# Patient Record
Sex: MALE | Race: WHITE | NOT HISPANIC OR LATINO
[De-identification: names, ages, dates, MRNs, and addresses within clinical notes are randomized per-mention and may not be internally consistent; named-entity substitution may affect disease eponyms.]

---

## 2019-02-26 PROBLEM — Z00.00 ENCOUNTER FOR PREVENTIVE HEALTH EXAMINATION: Status: ACTIVE | Noted: 2019-02-26

## 2022-04-28 ENCOUNTER — APPOINTMENT (OUTPATIENT)
Dept: ORTHOPEDIC SURGERY | Facility: CLINIC | Age: 62
End: 2022-04-28
Payer: OTHER MISCELLANEOUS

## 2022-04-28 VITALS — HEIGHT: 73 IN | WEIGHT: 255 LBS | BODY MASS INDEX: 33.8 KG/M2

## 2022-04-28 DIAGNOSIS — Z86.39 PERSONAL HISTORY OF OTHER ENDOCRINE, NUTRITIONAL AND METABOLIC DISEASE: ICD-10-CM

## 2022-04-28 DIAGNOSIS — R73.03 PREDIABETES.: ICD-10-CM

## 2022-04-28 DIAGNOSIS — I10 ESSENTIAL (PRIMARY) HYPERTENSION: ICD-10-CM

## 2022-04-28 PROCEDURE — 99072 ADDL SUPL MATRL&STAF TM PHE: CPT

## 2022-04-28 PROCEDURE — 99214 OFFICE O/P EST MOD 30 MIN: CPT

## 2022-04-28 RX ORDER — ROSUVASTATIN CALCIUM 5 MG/1
TABLET, FILM COATED ORAL
Refills: 0 | Status: ACTIVE | COMMUNITY

## 2022-04-28 RX ORDER — METFORMIN HYDROCHLORIDE 625 MG/1
TABLET ORAL
Refills: 0 | Status: ACTIVE | COMMUNITY

## 2022-04-28 NOTE — WORK
[Total] : total [15+ days] : 15+ days [Patient] : patient [FreeTextEntry1] : poor.\par The patient cannot return to work.

## 2022-04-28 NOTE — DISCUSSION/SUMMARY
[de-identified] : Timing and frequency of medication has been discussed with patient.\par I have consulted the  registry for the purpose of reviewing the patients controlled substance.\par

## 2022-05-03 RX ORDER — HYDROCODONE BITARTRATE AND ACETAMINOPHEN 7.5; 325 MG/1; MG/1
7.5-325 TABLET ORAL
Qty: 50 | Refills: 0 | Status: DISCONTINUED | COMMUNITY
Start: 2022-04-28 | End: 2022-05-03

## 2022-06-02 ENCOUNTER — APPOINTMENT (OUTPATIENT)
Dept: ORTHOPEDIC SURGERY | Facility: CLINIC | Age: 62
End: 2022-06-02
Payer: OTHER MISCELLANEOUS

## 2022-06-02 PROCEDURE — 99213 OFFICE O/P EST LOW 20 MIN: CPT

## 2022-06-02 PROCEDURE — 99072 ADDL SUPL MATRL&STAF TM PHE: CPT

## 2022-06-02 NOTE — HISTORY OF PRESENT ILLNESS
[de-identified] : WC 1/26/16\par \par Follow-up today. Symptoms continue without significant change. He has been doing HEP and managing with medication. OOW.

## 2022-06-02 NOTE — DISCUSSION/SUMMARY
[de-identified] : Timing and frequency of medication has been discussed with patient.\par I have consulted the  registry for the purpose of reviewing the patients controlled substance.\par \par Progress note completed by SUSANAN Olivo.\par \par Physician Instructions:\par The documentation recorded by the PA accurately reflects the service I personally performed and decisions made by me.\par

## 2022-07-07 ENCOUNTER — APPOINTMENT (OUTPATIENT)
Dept: ORTHOPEDIC SURGERY | Facility: CLINIC | Age: 62
End: 2022-07-07

## 2022-07-07 VITALS — HEIGHT: 73 IN | BODY MASS INDEX: 33.8 KG/M2 | WEIGHT: 255 LBS

## 2022-07-07 PROCEDURE — 99072 ADDL SUPL MATRL&STAF TM PHE: CPT

## 2022-07-07 PROCEDURE — 99213 OFFICE O/P EST LOW 20 MIN: CPT

## 2022-07-07 RX ORDER — NAPROXEN 500 MG/1
500 TABLET ORAL
Qty: 60 | Refills: 0 | Status: COMPLETED | COMMUNITY
Start: 2022-02-01

## 2022-07-07 RX ORDER — PANTOPRAZOLE 40 MG/1
40 TABLET, DELAYED RELEASE ORAL
Qty: 90 | Refills: 0 | Status: COMPLETED | COMMUNITY
Start: 2022-03-31

## 2022-07-07 RX ORDER — FAMOTIDINE 10 MG/1
TABLET, FILM COATED ORAL
Refills: 0 | Status: COMPLETED | COMMUNITY
End: 2022-07-07

## 2022-07-07 RX ORDER — IRBESARTAN 300 MG/1
TABLET ORAL
Refills: 0 | Status: COMPLETED | COMMUNITY
End: 2022-07-07

## 2022-07-07 RX ORDER — ALPRAZOLAM 1 MG/1
1 TABLET ORAL
Qty: 90 | Refills: 0 | Status: ACTIVE | COMMUNITY
Start: 2022-06-22

## 2022-07-07 RX ORDER — CETIRIZINE HYDROCHLORIDE 10 MG/1
10 TABLET, COATED ORAL
Qty: 30 | Refills: 0 | Status: ACTIVE | COMMUNITY
Start: 2022-06-24

## 2022-07-07 RX ORDER — AMLODIPINE BESYLATE 10 MG/1
10 TABLET ORAL
Qty: 90 | Refills: 0 | Status: ACTIVE | COMMUNITY
Start: 2022-05-03

## 2022-07-07 RX ORDER — OFLOXACIN OTIC 3 MG/ML
0.3 SOLUTION AURICULAR (OTIC)
Qty: 10 | Refills: 0 | Status: COMPLETED | COMMUNITY
Start: 2022-04-05

## 2022-07-07 RX ORDER — FLUOXETINE HYDROCHLORIDE 10 MG/1
10 CAPSULE ORAL
Qty: 180 | Refills: 0 | Status: ACTIVE | COMMUNITY
Start: 2022-05-20

## 2022-07-07 RX ORDER — IRBESARTAN AND HYDROCHLOROTHIAZIDE 300; 12.5 MG/1; MG/1
300-12.5 TABLET ORAL
Qty: 60 | Refills: 0 | Status: ACTIVE | COMMUNITY
Start: 2022-04-13

## 2022-07-07 NOTE — WORK
[Total] : total [15+ days] : 15+ days [Patient] : patient [FreeTextEntry1] : poor.\par The patient cannot return to work. No

## 2022-07-07 NOTE — REVIEW OF SYSTEMS
History of Present Illness





- General


Chief Complaint: Headache


Stated Complaint: HEADACHE


Time Seen by Provider: 02/20/18 16:49


History Source: Patient


Exam Limitations: No Limitations





- History of Present Illness


Initial Comments: 





02/20/18 18:40


Pt with episodic headache over the past 3 years relieved with fioricet which 

was prescribed by pts pcp. Pt requesting fioricet refill. Pt denies worsening s/

s. Pt states has not had any imaging for the above. Pt does state intermittent 

blurry vision with headaches since onset. Pt denies nausea, weakness, fever, or 

neck pain.





Timing/Duration: reports: 4-6 hours


Severity: Yes: mild


Associated Symptoms: reports: other





Past History





- Travel


Traveled outside of the country in the last 30 days: No





- Past Medical History


Allergies/Adverse Reactions: 


 Allergies











Allergy/AdvReac Type Severity Reaction Status Date / Time


 


No Known Allergies Allergy   Verified 02/20/18 16:49











Home Medications: 


Ambulatory Orders





NK [No Known Home Medication]  05/01/16 











- Immunization History


Immunization Up to Date: Yes





- Suicide/Smoking/Psychosocial Hx


Smoking History: Never smoked


Hx Alcohol Use: No


Drug/Substance Use Hx: No


Substance Use Type: None


Patient Lives Alone: No


Lives with/in: spouse/SO





**Review of Systems





- Review of Systems


Able to Perform ROS?: Yes


Constitutional: No: Symptoms Reported


HEENTM: Yes: Blurred Vision (intermittently)


ABD/GI: No: Symptoms Reported


Musculoskeletal: No: Symptoms Reported


Integumentary: No: Symptoms Reported


Neurological: Yes: Headache





*Physical Exam





- Vital Signs


 Last Vital Signs











Temp Pulse Resp BP Pulse Ox


 


 97.9 F   74   20   136/92   97 


 


 02/20/18 16:49  02/20/18 16:49  02/20/18 16:49  02/20/18 16:49  02/20/18 16:49














- Physical Exam


General Appearance: Yes: Nourished, Appropriately Dressed.  No: Apparent 

Distress


HEENT: positive: EOMI, SUSANNA


Cardiovascular: positive: Regular Rhythm, Regular Rate.  negative: Murmur


Extremity: positive: Normal Capillary Refill


Integumentary: positive: Normal Color, Warm, Moist


Neurologic: positive: Normal Mood/Affect, Motor Strength 5/5 (ambulatory)





ED Treatment Course





- RADIOLOGY


Radiology Studies Ordered: 














 Category Date Time Status


 


 HEAD CT WITHOUT CONTRAST [CT] Stat CT Scan  02/20/18 18:26 Ordered














Medical Decision Making





- Medical Decision Making





02/20/18 18:34


Pt with episodic headache now constant since yesterday. Pt on exam with no 

acute findings. Pt ordered for head ct and toradol. 


02/20/18 19:03


Head CT negative. Patient to be discharged home with refill to Fioricet.





*DC/Admit/Observation/Transfer


Diagnosis at time of Disposition: 


 Headache








- Discharge Dispostion


Disposition: HOME


Condition at time of disposition: Improved





- Referrals


Referrals: 


Caridad Portillo [Primary Care Provider] - 





- Patient Instructions


Printed Discharge Instructions:  DI for Migraine


Additional Instructions: 


Drink plenty of fluids and avoid triggers.


Take Fioricet for pain. 





- Post Discharge Activity [Joint Pain] : joint pain [Negative] : Heme/Lymph

## 2022-07-07 NOTE — HISTORY OF PRESENT ILLNESS
[de-identified] : WC 1/26/16\par \par Follow-up today. Symptoms continue. Some days better than others. He has been doing HEP and managing with medication. OOW.

## 2022-07-07 NOTE — DISCUSSION/SUMMARY
[de-identified] : Timing and frequency of medication has been discussed with patient.\par I have consulted the  registry for the purpose of reviewing the patients controlled substance.\par \par Progress note completed by SUSANNA Olivo.\par \par Physician Instructions:\par The documentation recorded by the PA accurately reflects the service I personally performed and decisions made by me.\par

## 2022-08-18 ENCOUNTER — APPOINTMENT (OUTPATIENT)
Dept: ORTHOPEDIC SURGERY | Facility: CLINIC | Age: 62
End: 2022-08-18

## 2022-08-18 VITALS — WEIGHT: 255 LBS | HEIGHT: 73 IN | BODY MASS INDEX: 33.8 KG/M2

## 2022-08-18 PROCEDURE — 99072 ADDL SUPL MATRL&STAF TM PHE: CPT

## 2022-08-18 PROCEDURE — 99213 OFFICE O/P EST LOW 20 MIN: CPT

## 2022-08-18 NOTE — HISTORY OF PRESENT ILLNESS
[de-identified] : WC 1/26/16\par \par Follow-up today. Symptoms continue. Notes increased pain in the right glut. Denies radiating pain. He has been doing HEP and managing with medication. OOW. [FreeTextEntry5] :  1/26/16

## 2022-08-18 NOTE — DISCUSSION/SUMMARY
[de-identified] : Timing and frequency of medication has been discussed with patient.\par I have consulted the  registry for the purpose of reviewing the patients controlled substance.\par \par Progress note completed by SUSANNA Olivo.\par \par Physician Instructions:\par The documentation recorded by the PA accurately reflects the service I personally performed and decisions made by me.\par

## 2022-09-22 ENCOUNTER — APPOINTMENT (OUTPATIENT)
Dept: ORTHOPEDIC SURGERY | Facility: CLINIC | Age: 62
End: 2022-09-22

## 2022-09-22 VITALS — HEIGHT: 73 IN | WEIGHT: 255 LBS | BODY MASS INDEX: 33.8 KG/M2

## 2022-09-22 PROCEDURE — 99213 OFFICE O/P EST LOW 20 MIN: CPT | Mod: ACP

## 2022-09-22 PROCEDURE — 99072 ADDL SUPL MATRL&STAF TM PHE: CPT | Mod: ACP

## 2022-09-22 NOTE — HISTORY OF PRESENT ILLNESS
[de-identified] : WC 1/26/16\par \par Follow-up today. Symptoms continue. Some days better than others. He has been doing HEP and managing with medication. OOW.

## 2022-11-17 ENCOUNTER — APPOINTMENT (OUTPATIENT)
Dept: ORTHOPEDIC SURGERY | Facility: CLINIC | Age: 62
End: 2022-11-17

## 2022-11-17 VITALS — HEIGHT: 73 IN | WEIGHT: 255 LBS | BODY MASS INDEX: 33.8 KG/M2

## 2022-11-17 DIAGNOSIS — M51.36 OTHER INTERVERTEBRAL DISC DEGENERATION, LUMBAR REGION: ICD-10-CM

## 2022-11-17 DIAGNOSIS — M54.16 RADICULOPATHY, LUMBAR REGION: ICD-10-CM

## 2022-11-17 DIAGNOSIS — M51.37 OTHER INTERVERTEBRAL DISC DEGENERATION, LUMBOSACRAL REGION: ICD-10-CM

## 2022-11-17 DIAGNOSIS — M51.26 OTHER INTERVERTEBRAL DISC DISPLACEMENT, LUMBAR REGION: ICD-10-CM

## 2022-11-17 PROCEDURE — 99072 ADDL SUPL MATRL&STAF TM PHE: CPT

## 2022-11-17 PROCEDURE — 99213 OFFICE O/P EST LOW 20 MIN: CPT

## 2022-11-17 RX ORDER — OXYCODONE AND ACETAMINOPHEN 7.5; 325 MG/1; MG/1
7.5-325 TABLET ORAL
Qty: 50 | Refills: 0 | Status: ACTIVE | COMMUNITY
Start: 2022-05-03 | End: 1900-01-01

## 2022-11-19 NOTE — HISTORY OF PRESENT ILLNESS
[8] : 8 [7] : 7 [Dull/Aching] : dull/aching [Sharp] : sharp [Not working due to injury] : Work status: not working due to injury [de-identified] : WC 1/26/16\par \par Follow-up today. Symptoms continue. He has been doing HEP and managing with medication. OOW. Some days bad enough to not get out of bed. May decide to have surgery.  [] : Post Surgical Visit: no [FreeTextEntry1] : back  [de-identified] : none

## 2022-11-19 NOTE — DISCUSSION/SUMMARY
[de-identified] : Timing and frequency of medication has been discussed with patient.\par I have consulted the  registry for the purpose of reviewing the patients controlled substance.\par \par Pain center advised and follow up spine surgeon and to see us as needed\par

## 2023-03-03 ENCOUNTER — APPOINTMENT (OUTPATIENT)
Dept: OPHTHALMOLOGY | Facility: CLINIC | Age: 63
End: 2023-03-03
Payer: MEDICARE

## 2023-03-03 ENCOUNTER — NON-APPOINTMENT (OUTPATIENT)
Age: 63
End: 2023-03-03

## 2023-03-03 PROCEDURE — 99204 OFFICE O/P NEW MOD 45 MIN: CPT

## 2023-03-03 PROCEDURE — 92285 EXTERNAL OCULAR PHOTOGRAPHY: CPT

## 2023-03-21 ENCOUNTER — APPOINTMENT (OUTPATIENT)
Dept: OPHTHALMOLOGY | Facility: CLINIC | Age: 63
End: 2023-03-21

## 2023-03-30 ENCOUNTER — APPOINTMENT (OUTPATIENT)
Dept: OPHTHALMOLOGY | Facility: CLINIC | Age: 63
End: 2023-03-30

## 2023-07-31 ENCOUNTER — APPOINTMENT (OUTPATIENT)
Dept: OPHTHALMOLOGY | Facility: CLINIC | Age: 63
End: 2023-07-31
Payer: MEDICARE

## 2023-07-31 ENCOUNTER — NON-APPOINTMENT (OUTPATIENT)
Age: 63
End: 2023-07-31

## 2023-07-31 PROCEDURE — 67917 REPAIR EYELID DEFECT: CPT | Mod: E4

## 2023-08-21 ENCOUNTER — NON-APPOINTMENT (OUTPATIENT)
Age: 63
End: 2023-08-21

## 2023-08-21 ENCOUNTER — APPOINTMENT (OUTPATIENT)
Dept: OPHTHALMOLOGY | Facility: CLINIC | Age: 63
End: 2023-08-21
Payer: MEDICARE

## 2023-08-21 PROCEDURE — 92083 EXTENDED VISUAL FIELD XM: CPT

## 2023-08-21 PROCEDURE — 99214 OFFICE O/P EST MOD 30 MIN: CPT | Mod: 24

## 2023-08-21 PROCEDURE — 92285 EXTERNAL OCULAR PHOTOGRAPHY: CPT

## 2023-11-30 ENCOUNTER — APPOINTMENT (OUTPATIENT)
Dept: OPHTHALMOLOGY | Facility: CLINIC | Age: 63
End: 2023-11-30
Payer: MEDICARE

## 2023-11-30 ENCOUNTER — NON-APPOINTMENT (OUTPATIENT)
Age: 63
End: 2023-11-30

## 2023-11-30 ENCOUNTER — APPOINTMENT (OUTPATIENT)
Dept: OPHTHALMOLOGY | Facility: CLINIC | Age: 63
End: 2023-11-30

## 2023-11-30 PROCEDURE — 15823 BLEPHARP UPR EYELID XCSV SKN: CPT | Mod: 50

## 2023-12-11 ENCOUNTER — APPOINTMENT (OUTPATIENT)
Dept: OPHTHALMOLOGY | Facility: CLINIC | Age: 63
End: 2023-12-11
Payer: MEDICARE

## 2023-12-11 ENCOUNTER — NON-APPOINTMENT (OUTPATIENT)
Age: 63
End: 2023-12-11

## 2023-12-11 PROCEDURE — 99213 OFFICE O/P EST LOW 20 MIN: CPT | Mod: 24

## 2023-12-11 PROCEDURE — 92285 EXTERNAL OCULAR PHOTOGRAPHY: CPT

## 2024-03-14 ENCOUNTER — APPOINTMENT (OUTPATIENT)
Dept: OPHTHALMOLOGY | Facility: CLINIC | Age: 64
End: 2024-03-14

## 2025-01-15 ENCOUNTER — APPOINTMENT (OUTPATIENT)
Dept: ORTHOPEDIC SURGERY | Facility: CLINIC | Age: 65
End: 2025-01-15
Payer: OTHER MISCELLANEOUS

## 2025-01-15 VITALS — BODY MASS INDEX: 31.41 KG/M2 | WEIGHT: 237 LBS | HEIGHT: 73 IN

## 2025-01-15 DIAGNOSIS — M51.26 OTHER INTERVERTEBRAL DISC DISPLACEMENT, LUMBAR REGION: ICD-10-CM

## 2025-01-15 DIAGNOSIS — M51.369: ICD-10-CM

## 2025-01-15 DIAGNOSIS — M54.16 RADICULOPATHY, LUMBAR REGION: ICD-10-CM

## 2025-01-15 DIAGNOSIS — M47.816 SPONDYLOSIS W/OUT MYELOPATHY OR RADICULOPATHY, LUMBAR REGION: ICD-10-CM

## 2025-01-15 PROCEDURE — 72170 X-RAY EXAM OF PELVIS: CPT

## 2025-01-15 PROCEDURE — 99204 OFFICE O/P NEW MOD 45 MIN: CPT

## 2025-01-15 PROCEDURE — 72110 X-RAY EXAM L-2 SPINE 4/>VWS: CPT
